# Patient Record
Sex: FEMALE | Race: WHITE | NOT HISPANIC OR LATINO | Employment: OTHER | URBAN - METROPOLITAN AREA
[De-identification: names, ages, dates, MRNs, and addresses within clinical notes are randomized per-mention and may not be internally consistent; named-entity substitution may affect disease eponyms.]

---

## 2017-03-22 ENCOUNTER — OFFICE VISIT (OUTPATIENT)
Dept: SLEEP MEDICINE | Facility: HOSPITAL | Age: 77
End: 2017-03-22

## 2017-03-22 VITALS
BODY MASS INDEX: 34.32 KG/M2 | DIASTOLIC BLOOD PRESSURE: 80 MMHG | HEART RATE: 84 BPM | OXYGEN SATURATION: 90 % | SYSTOLIC BLOOD PRESSURE: 194 MMHG | HEIGHT: 65 IN | WEIGHT: 206 LBS

## 2017-03-22 DIAGNOSIS — R06.83 SNORING: Primary | ICD-10-CM

## 2017-03-22 DIAGNOSIS — G47.33 OBSTRUCTIVE SLEEP APNEA, ADULT: ICD-10-CM

## 2017-03-22 PROCEDURE — 99214 OFFICE O/P EST MOD 30 MIN: CPT | Performed by: INTERNAL MEDICINE

## 2017-03-22 NOTE — PROGRESS NOTES
"Subjective   Christine Whittaker is a 76 y.o. female is here today for follow-up.  Patient is followed here with obstructive sleep apnea.  Her primary care physician is Dr. Pete Acharya    History of Present Illness  A she was seen here last September 20.  She had a history of obstructive sleep apnea with a history of hypertension coronary artery disease and obesity.  She had a sleep sleep study last September that showed severe obstructive sleep apnea.  She's been on CPAP but is had problems using the mask.  She can get this healed properly and would come off of her face while she was asleep she is apparently become frustrated and hasn't been using it that much.  The following portions of the patient's history were reviewed and updated as appropriate: allergies, current medications and problem list.    Review of Systems   Constitutional: Negative.    HENT: Positive for congestion, postnasal drip and sinus pressure.    Eyes: Negative.    Respiratory: Positive for cough, shortness of breath and wheezing.    Cardiovascular: Negative.    Gastrointestinal: Negative.    Endocrine: Positive for cold intolerance and heat intolerance.   Genitourinary: Negative.    Musculoskeletal: Negative.    Skin: Negative.    Allergic/Immunologic: Negative.    Neurological: Negative.    Hematological: Negative.    Psychiatric/Behavioral: Negative.     [place] scores 16/24    Objective  BP (!) 194/80  Pulse 84  Ht 65\" (165.1 cm)  Wt 206 lb (93.4 kg)  SpO2 90%  BMI 34.28 kg/m2    Physical Exam   Constitutional: She is oriented to person, place, and time. She appears well-developed and well-nourished.   She is obese.   HENT:   Head: Normocephalic and atraumatic.   She has Mallampati class IV anatomy   Eyes: EOM are normal. Pupils are equal, round, and reactive to light.   Neck: Normal range of motion. Neck supple.   Cardiovascular: Normal rate, regular rhythm and normal heart sounds.    Pulmonary/Chest: Effort normal and breath sounds " normal.   Abdominal: Soft. Bowel sounds are normal.   Musculoskeletal: Normal range of motion. She exhibits no edema.   Neurological: She is alert and oriented to person, place, and time.   Skin: Skin is warm and dry.   Psychiatric: She has a normal mood and affect. Her behavior is normal.    sleep study showed good sleep efficiency but she had an apnea popping index of 40.3.  She had oxygen desaturations to 53% she had a split-night study but had an incomplete titration.    Download from her machine for the past 145 days shows usage only 28% the days.  Her average usage was only 2 hours 3 minutes.  Her index when she had on was 8.7.  She was on it auto CPAP with 12 cm minimum and 20 cm maximum  Assessment/Plan   Christine was seen today for follow-up.    Diagnoses and all orders for this visit:    Snoring  -     Generic Auto PAP Order    Obstructive sleep apnea, adult  -     Generic Auto PAP Order  -     Ambulatory Referral to ENT (Otolaryngology)     patient has severe obstructive sleep apnea but does not seem to be able to use the CPAP.  She does not wish to continue.  We will place an order for her to be discontinued.  She is encouraged to lose weight.  She wishes consider surgical options we'll make an appointment with the ENT physician in [place].  She is encouraged to avoid alcohol and sedatives close to bedtime.  She is encouraged practice lateral position sleep.  We will see her back in 6 months.    Tejas Block MD [facility]  Sleep Medicine  Pulmonary and Critical Care Medicine

## 2017-03-22 NOTE — PATIENT INSTRUCTIONS
Sleep Apnea  Sleep apnea is a condition in which breathing pauses or becomes shallow during sleep. Episodes of sleep apnea usually last 10 seconds or longer, and they may occur as many as 20 times an hour. Sleep apnea disrupts your sleep and keeps your body from getting the rest that it needs. This condition can increase your risk of certain health problems, including:  · Heart attack.  · Stroke.  · Obesity.  · Diabetes.  · Heart failure.  · Irregular heartbeat.  There are three kinds of sleep apnea:  · Obstructive sleep apnea. This kind is caused by a blocked or collapsed airway.  · Central sleep apnea. This kind happens when the part of the brain that controls breathing does not send the correct signals to the muscles that control breathing.  · Mixed sleep apnea. This is a combination of obstructive and central sleep apnea.  CAUSES  The most common cause of this condition is a collapsed or blocked airway. An airway can collapse or become blocked if:  · Your throat muscles are abnormally relaxed.  · Your tongue and tonsils are larger than normal.  · You are overweight.  · Your airway is smaller than normal.  RISK FACTORS  This condition is more likely to develop in people who:  · Are overweight.  · Smoke.  · Have a smaller than normal airway.  · Are elderly.  · Are male.  · Drink alcohol.  · Take sedatives or tranquilizers.  · Have a family history of sleep apnea.  SYMPTOMS  Symptoms of this condition include:  · Trouble staying asleep.  · Daytime sleepiness and tiredness.  · Irritability.  · Loud snoring.  · Morning headaches.  · Trouble concentrating.  · Forgetfulness.  · Decreased interest in sex.  · Unexplained sleepiness.  · Mood swings.  · Personality changes.  · Feelings of depression.  · Waking up often during the night to urinate.  · Dry mouth.  · Sore throat.  DIAGNOSIS  This condition may be diagnosed with:  · A medical history.  · A physical exam.  · A series of tests that are done while you are  sleeping (sleep study). These tests are usually done in a sleep lab, but they may also be done at home.  TREATMENT  Treatment for this condition aims to restore normal breathing and to ease symptoms during sleep. It may involve managing health issues that can affect breathing, such as high blood pressure or obesity. Treatment may include:  · Sleeping on your side.  · Using a decongestant if you have nasal congestion.  · Avoiding the use of depressants, including alcohol, sedatives, and narcotics.  · Losing weight if you are overweight.  · Making changes to your diet.  · Quitting smoking.  · Using a device to open your airway while you sleep, such as:    An oral appliance. This is a custom-made mouthpiece that shifts your lower jaw forward.    A continuous positive airway pressure (CPAP) device. This device delivers oxygen to your airway through a mask.    A nasal expiratory positive airway pressure (EPAP) device. This device has valves that you put into each nostril.    A bi-level positive airway pressure (BPAP) device. This device delivers oxygen to your airway through a mask.  · Surgery if other treatments do not work. During surgery, excess tissue is removed to create a wider airway.  It is important to get treatment for sleep apnea. Without treatment, this condition can lead to:  · High blood pressure.  · Coronary artery disease.  · (Men) An inability to achieve or maintain an erection (impotence).  · Reduced thinking abilities.  HOME CARE INSTRUCTIONS  · Make any lifestyle changes that your health care provider recommends.  · Eat a healthy, well-balanced diet.  · Take over-the-counter and prescription medicines only as told by your health care provider.  · Avoid using depressants, including alcohol, sedatives, and narcotics.  · Take steps to lose weight if you are overweight.  · If you were given a device to open your airway while you sleep, use it only as told by your health care provider.  · Do not use any  tobacco products, such as cigarettes, chewing tobacco, and e-cigarettes. If you need help quitting, ask your health care provider.  · Keep all follow-up visits as told by your health care provider. This is important.  SEEK MEDICAL CARE IF:  · The device that you received to open your airway during sleep is uncomfortable or does not seem to be working.  · Your symptoms do not improve.  · Your symptoms get worse.  SEEK IMMEDIATE MEDICAL CARE IF:  · You develop chest pain.  · You develop shortness of breath.  · You develop discomfort in your back, arms, or stomach.  · You have trouble speaking.  · You have weakness on one side of your body.  · You have drooping in your face.  These symptoms may represent a serious problem that is an emergency. Do not wait to see if the symptoms will go away. Get medical help right away. Call your local emergency services (911 in the [place] Do not drive yourself to the hospital.     This information is not intended to replace advice given to you by your health care provider. Make sure you discuss any questions you have with your health care provider.     Document Released: 12/08/2003 Document Revised: 01/13/2017 Document Reviewed: 09/26/2016  [employer] Patient Education ©2016 [employer]

## 2017-03-27 ENCOUNTER — TELEPHONE (OUTPATIENT)
Dept: SLEEP MEDICINE | Facility: HOSPITAL | Age: 77
End: 2017-03-27

## 2017-09-11 ENCOUNTER — DOCUMENTATION (OUTPATIENT)
Dept: SLEEP MEDICINE | Facility: HOSPITAL | Age: 77
End: 2017-09-11

## 2017-09-11 NOTE — PROGRESS NOTES
Patient did not wish to schedule follow up because there was not any open appointment until December 2017. Patient stated that she would find a new sleep doctor to go to or to have her Cardiologist write her a RX for a new cpap machine. Patient gave other cpap machine back to [id number]